# Patient Record
Sex: FEMALE | Race: WHITE | NOT HISPANIC OR LATINO | Employment: UNEMPLOYED | URBAN - METROPOLITAN AREA
[De-identification: names, ages, dates, MRNs, and addresses within clinical notes are randomized per-mention and may not be internally consistent; named-entity substitution may affect disease eponyms.]

---

## 2018-01-03 ENCOUNTER — LAB VISIT (OUTPATIENT)
Dept: LAB | Facility: HOSPITAL | Age: 23
End: 2018-01-03
Attending: OBSTETRICS & GYNECOLOGY
Payer: MEDICAID

## 2018-01-03 ENCOUNTER — OFFICE VISIT (OUTPATIENT)
Dept: OBSTETRICS AND GYNECOLOGY | Facility: CLINIC | Age: 23
End: 2018-01-03
Payer: MEDICAID

## 2018-01-03 VITALS
BODY MASS INDEX: 27.7 KG/M2 | HEIGHT: 63 IN | WEIGHT: 156.31 LBS | SYSTOLIC BLOOD PRESSURE: 124 MMHG | DIASTOLIC BLOOD PRESSURE: 68 MMHG

## 2018-01-03 DIAGNOSIS — Z32.01 POSITIVE PREGNANCY TEST: ICD-10-CM

## 2018-01-03 DIAGNOSIS — O36.8390 UNABLE TO HEAR FETAL HEART TONES AS REASON FOR ULTRASOUND SCAN: ICD-10-CM

## 2018-01-03 DIAGNOSIS — Z32.00 POSSIBLE PREGNANCY: Primary | ICD-10-CM

## 2018-01-03 LAB
BASOPHILS # BLD AUTO: 0.04 K/UL
BASOPHILS NFR BLD: 0.4 %
DIFFERENTIAL METHOD: ABNORMAL
EOSINOPHIL # BLD AUTO: 0.9 K/UL
EOSINOPHIL NFR BLD: 9 %
ERYTHROCYTE [DISTWIDTH] IN BLOOD BY AUTOMATED COUNT: 13.5 %
HCT VFR BLD AUTO: 35.9 %
HGB BLD-MCNC: 12 G/DL
IMM GRANULOCYTES # BLD AUTO: 0.03 K/UL
IMM GRANULOCYTES NFR BLD AUTO: 0.3 %
LYMPHOCYTES # BLD AUTO: 1.2 K/UL
LYMPHOCYTES NFR BLD: 12.8 %
MCH RBC QN AUTO: 28.7 PG
MCHC RBC AUTO-ENTMCNC: 33.4 G/DL
MCV RBC AUTO: 86 FL
MONOCYTES # BLD AUTO: 0.5 K/UL
MONOCYTES NFR BLD: 5.2 %
NEUTROPHILS # BLD AUTO: 6.9 K/UL
NEUTROPHILS NFR BLD: 72.3 %
NRBC BLD-RTO: 0 /100 WBC
PLATELET # BLD AUTO: 150 K/UL
PMV BLD AUTO: 14.1 FL
RBC # BLD AUTO: 4.18 M/UL
TSH SERPL DL<=0.005 MIU/L-ACNC: 0.93 UIU/ML
WBC # BLD AUTO: 9.6 K/UL

## 2018-01-03 PROCEDURE — 99204 OFFICE O/P NEW MOD 45 MIN: CPT | Mod: 25,S$PBB,TH, | Performed by: OBSTETRICS & GYNECOLOGY

## 2018-01-03 PROCEDURE — 99203 OFFICE O/P NEW LOW 30 MIN: CPT | Mod: PBBFAC,PN | Performed by: OBSTETRICS & GYNECOLOGY

## 2018-01-03 PROCEDURE — 86901 BLOOD TYPING SEROLOGIC RH(D): CPT | Mod: 91

## 2018-01-03 PROCEDURE — 84443 ASSAY THYROID STIM HORMONE: CPT

## 2018-01-03 PROCEDURE — 86901 BLOOD TYPING SEROLOGIC RH(D): CPT

## 2018-01-03 PROCEDURE — 76817 TRANSVAGINAL US OBSTETRIC: CPT | Mod: 26,S$PBB,, | Performed by: OBSTETRICS & GYNECOLOGY

## 2018-01-03 PROCEDURE — 36415 COLL VENOUS BLD VENIPUNCTURE: CPT | Mod: PO

## 2018-01-03 PROCEDURE — 85025 COMPLETE CBC W/AUTO DIFF WBC: CPT

## 2018-01-03 PROCEDURE — 99999 PR PBB SHADOW E&M-NEW PATIENT-LVL III: CPT | Mod: PBBFAC,,, | Performed by: OBSTETRICS & GYNECOLOGY

## 2018-01-03 PROCEDURE — 81220 CFTR GENE COM VARIANTS: CPT

## 2018-01-03 PROCEDURE — 76817 TRANSVAGINAL US OBSTETRIC: CPT | Mod: PBBFAC,PN | Performed by: OBSTETRICS & GYNECOLOGY

## 2018-01-03 PROCEDURE — 86762 RUBELLA ANTIBODY: CPT

## 2018-01-03 PROCEDURE — 86703 HIV-1/HIV-2 1 RESULT ANTBDY: CPT

## 2018-01-03 PROCEDURE — 87340 HEPATITIS B SURFACE AG IA: CPT

## 2018-01-03 PROCEDURE — 86592 SYPHILIS TEST NON-TREP QUAL: CPT

## 2018-01-03 NOTE — PROCEDURES
Procedures   ULTRASOUND:   Bedside Ultrasound Findings      Narrative     Ultrasound transvaginal performed with the 6.5mhz probe in the usual fashion.      The uterus appears normal, Viable youngblood intrauterine pregnancy was seen, yolk sac was seen  Crown-rump length = 25 mm with flicker, consistent with 9w2d and EDC 08/06/2018.   The right ovary is not visualized and the left ovary is not visualized. No free fluid in cul-de-sac or adnexal pathology seen      Impression       1.  viable pregnancy, 9w2d by CRL today  2. No abnormality noted.

## 2018-01-03 NOTE — PROGRESS NOTES
"Chief Complaint   Patient presents with    Possible Pregnancy    back pain in the lower middle, hx of mastitis       History of Present Illness   22 y.o.  female patient presents today for new pregnancy, no complaints. Low back pains since epidural 18 months ago- c/section with G1, 4cm max dilation. No bleeding or pain, counseled.     Past medical and surgical history reviewed.   I have reviewed the patient's medical history in detail and updated the computerized patient record.    Review of patient's allergies indicates:   Allergen Reactions    Codeine Hives         Review of Systems - Negative except HPI  GEN ROS: negative for - chills or fever  Breast ROS: negative for breast lumps  Genito-Urinary ROS: no dysuria, trouble voiding, or hematuria      Physical Examination:  /68   Ht 5' 3" (1.6 m)   Wt 70.9 kg (156 lb 4.9 oz)   LMP 10/01/2017 (Approximate)   BMI 27.69 kg/m²    Constitutional: She is oriented to person, place, and time. She appears well-developed and well-nourished. No distress.   HENT:   Head: Normocephalic and atraumatic.   Eyes: Conjunctivae and EOM are normal. No scleral icterus.   Neck: Normal range of motion. Neck supple. No tracheal deviation present.   Cardiovascular: Normal rate.    Pulmonary/Chest: Effort normal. No respiratory distress. She exhibits no tenderness.  Abdominal: Soft. She exhibits no distension and no mass. There is no tenderness. There is no rebound and no guarding.   Genitourinary:    External rectal exam shows no thrombosed external hemorrhoids.    Pelvic exam was performed with patient supine.   No labial fusion.  Musculoskeletal: Normal range of motion. limited back pains  Neurological: She is alert and oriented to person, place, and time. Coordination normal.   Skin: Skin is warm and dry. She is not diaphoretic.   Psychiatric: She has a normal mood and affect.          Assessment:  Early pregnancy, unsure dates.   1. Possible pregnancy  POCT urine " pregnancy   2. Positive pregnancy test  POCT URINE DIPSTICK WITHOUT MICROSCOPE    Type & Screen, Labor & Delivery    CBC auto differential    Hepatitis B surface antigen    HIV-1 and HIV-2 antibodies    Cystic Fibrosis Mutation Panel    TSH    RPR    Rubella antibody, IgG       Plan:  OB labs  Follow up 3 weeks, PAP and exam at follow up  Need op not for possible  plans.   Patient informed will be contacted with results within 2 weeks. Encouraged to please call back or email if she has not heard from us by then.

## 2018-01-04 LAB
ABO + RH BLD: NORMAL
BLD GP AB SCN CELLS X3 SERPL QL: NORMAL
HBV SURFACE AG SERPL QL IA: NEGATIVE
HIV 1+2 AB+HIV1 P24 AG SERPL QL IA: NEGATIVE
RH BLD: NORMAL
RPR SER QL: NORMAL
RUBV IGG SER-ACNC: 18.9 IU/ML
RUBV IGG SER-IMP: REACTIVE

## 2018-01-05 LAB — CFTR MUT ANL BLD/T: NORMAL

## 2018-01-24 ENCOUNTER — ROUTINE PRENATAL (OUTPATIENT)
Dept: OBSTETRICS AND GYNECOLOGY | Facility: CLINIC | Age: 23
End: 2018-01-24
Payer: MEDICAID

## 2018-01-24 VITALS
WEIGHT: 157.44 LBS | BODY MASS INDEX: 27.88 KG/M2 | DIASTOLIC BLOOD PRESSURE: 60 MMHG | SYSTOLIC BLOOD PRESSURE: 110 MMHG

## 2018-01-24 DIAGNOSIS — Z3A.12 12 WEEKS GESTATION OF PREGNANCY: ICD-10-CM

## 2018-01-24 DIAGNOSIS — Z12.4 PAP SMEAR FOR CERVICAL CANCER SCREENING: Primary | ICD-10-CM

## 2018-01-24 PROCEDURE — 87624 HPV HI-RISK TYP POOLED RSLT: CPT

## 2018-01-24 PROCEDURE — 99212 OFFICE O/P EST SF 10 MIN: CPT | Mod: PBBFAC,PN | Performed by: OBSTETRICS & GYNECOLOGY

## 2018-01-24 PROCEDURE — 88175 CYTOPATH C/V AUTO FLUID REDO: CPT | Performed by: PATHOLOGY

## 2018-01-24 PROCEDURE — 87491 CHLMYD TRACH DNA AMP PROBE: CPT

## 2018-01-24 PROCEDURE — 99215 OFFICE O/P EST HI 40 MIN: CPT | Mod: TH,S$PBB,, | Performed by: OBSTETRICS & GYNECOLOGY

## 2018-01-24 PROCEDURE — 88141 CYTOPATH C/V INTERPRET: CPT | Mod: ,,, | Performed by: PATHOLOGY

## 2018-01-24 PROCEDURE — 99999 PR PBB SHADOW E&M-EST. PATIENT-LVL II: CPT | Mod: PBBFAC,,, | Performed by: OBSTETRICS & GYNECOLOGY

## 2018-01-24 PROCEDURE — 87086 URINE CULTURE/COLONY COUNT: CPT

## 2018-01-24 NOTE — PROGRESS NOTES
Complaints today: initial OB exam, no complaints    /60   Wt 71.4 kg (157 lb 6.5 oz)   LMP 10/01/2017 (Approximate)   BMI 27.88 kg/m²     22 y.o., at 12w2d by Estimated Date of Delivery: 18  There is no problem list on file for this patient.    OB History    Para Term  AB Living   3 1 1     1   SAB TAB Ectopic Multiple Live Births           1      # Outcome Date GA Lbr Angel/2nd Weight Sex Delivery Anes PTL Lv   3 Current            2 Term 16 40w0d  2.778 kg (6 lb 2 oz) F CS-LTranv EPI N DANIELA      Birth Comments: Billie Yoder    1                    Dating reviewed    Allergies and problem list reviewed and updated    Medical and surgical history reviewed    Prenatal labs reviewed and updated    Physical Exam:  ABD: soft, gravid, nontender    Assessment:  12 weeks, normal exam, doing well    Plan:    follow up 4 Weeks, bleeding/pain  precautions

## 2018-01-25 LAB
BACTERIA UR CULT: NORMAL
BACTERIA UR CULT: NORMAL
C TRACH DNA SPEC QL NAA+PROBE: NOT DETECTED
N GONORRHOEA DNA SPEC QL NAA+PROBE: NOT DETECTED

## 2018-01-29 LAB
HPV16 AG SPEC QL: POSITIVE
HPV16+18+H RISK 12 DNA CVX-IMP: NEGATIVE
HPV18 DNA SPEC QL NAA+PROBE: NEGATIVE

## 2018-02-07 ENCOUNTER — ROUTINE PRENATAL (OUTPATIENT)
Dept: OBSTETRICS AND GYNECOLOGY | Facility: CLINIC | Age: 23
End: 2018-02-07
Payer: MEDICAID

## 2018-02-07 VITALS — WEIGHT: 158.5 LBS | BODY MASS INDEX: 28.08 KG/M2

## 2018-02-07 DIAGNOSIS — Z3A.14 14 WEEKS GESTATION OF PREGNANCY: Primary | ICD-10-CM

## 2018-02-07 LAB
BILIRUB SERPL-MCNC: NORMAL MG/DL
BLOOD URINE, POC: NORMAL
COLOR, POC UA: NORMAL
GLUCOSE UR QL STRIP: NORMAL
KETONES UR QL STRIP: NORMAL
LEUKOCYTE ESTERASE URINE, POC: NORMAL
NITRITE, POC UA: NORMAL
PH, POC UA: 5
PROTEIN, POC: NORMAL
SPECIFIC GRAVITY, POC UA: NORMAL
UROBILINOGEN, POC UA: NORMAL

## 2018-02-07 PROCEDURE — 99212 OFFICE O/P EST SF 10 MIN: CPT | Mod: PBBFAC,PN | Performed by: OBSTETRICS & GYNECOLOGY

## 2018-02-07 PROCEDURE — 99999 PR PBB SHADOW E&M-EST. PATIENT-LVL II: CPT | Mod: PBBFAC,,, | Performed by: OBSTETRICS & GYNECOLOGY

## 2018-02-07 PROCEDURE — 57452 EXAM OF CERVIX W/SCOPE: CPT | Mod: ,,, | Performed by: OBSTETRICS & GYNECOLOGY

## 2018-02-07 PROCEDURE — 3008F BODY MASS INDEX DOCD: CPT | Mod: ,,, | Performed by: OBSTETRICS & GYNECOLOGY

## 2018-02-07 PROCEDURE — 81002 URINALYSIS NONAUTO W/O SCOPE: CPT | Mod: ,,, | Performed by: OBSTETRICS & GYNECOLOGY

## 2018-02-07 PROCEDURE — 99213 OFFICE O/P EST LOW 20 MIN: CPT | Mod: TH,25,, | Performed by: OBSTETRICS & GYNECOLOGY

## 2018-02-07 NOTE — PROGRESS NOTES
Complaints today: ASCUS PAP, need colposcopy    Wt 71.9 kg (158 lb 8.2 oz)   LMP 10/01/2017 (Approximate)   BMI 28.08 kg/m²     22 y.o., at 14w2d by Estimated Date of Delivery: 18  There is no problem list on file for this patient.    OB History    Para Term  AB Living   3 1 1     1   SAB TAB Ectopic Multiple Live Births           1      # Outcome Date GA Lbr Angel/2nd Weight Sex Delivery Anes PTL Lv   3 Current            2 Term 16 40w0d  2.778 kg (6 lb 2 oz) F CS-LTranv EPI N DANIELA      Birth Comments: Billie Ryan Yoder    1                    Dating reviewed    Allergies and problem list reviewed and updated    Medical and surgical history reviewed    Prenatal labs reviewed and updated    Physical Exam:  ABD: soft, gravid, nontender      COLPOSCOPY:  2018    PAP Result:  ASUCS w HR-HPV  1. 14 weeks gestation of pregnancy  POCT URINE DIPSTICK WITHOUT MICROSCOPE    US OB More Than 14 Wks First Gestation       PRE-COLPOSCOPY PROCEDURE COUNSELING:  Discussed the abnormal pap test findings, HPV, need for colposcopy and possible biopsies to determine a diagnosis and plan of care, treatments available, the minimal risks of bleeding and infection with a colposcopy, alternatives to colposcopy and she agrees to proceed.    Procedure:   Speculum was placed. Cervix completely visualized. transformation zone clearly visualized. Green filter activated: vascular abnormalities: not seen  Green filter disengaged and acetic acid applied in the usual fashion:  AcetoWhite epithelium not seen.  Mosaic pattern not seen.  Biopsy not performed.  ECC not done.    Monsel's solution applied to biopsy site for hemostasis and tolerated well.   The speculum was removed.  The patient tolerated the procedure well.    POST COLPOSCOPY COUNSELING:   Manage post colposcopy cramping with NSAIDs, Tylenol or Rx per [employer].  Avoid anything in vagina (intercourse, douching, tampons) one week after the procedure.  Expect  a clumpy blackish vaginal discharge (Monsel's solution) for several days.  Report bleeding heavier than a period, worsening pain, fever > 101.0 F, or foul-smelling vaginal discharge.  HPV vaccine recommended according to FDA age guidelines.  Importance of follow-up stressed.    Counseling lasted approximately 15 minutes and all her questions were answered.    Assessment:  1. 14 weeks gestation of pregnancy  POCT URINE DIPSTICK WITHOUT MICROSCOPE    US OB More Than 14 Wks First Gestation     Plan:PAP PP    Patient informed will be contacted within 2 weeks of results, either normal or abnormal. Please call if she has not heard from us by then.     Assessment:  Normal colpo, 14 weeks, doing well    Plan:    follow up 4 Weeks, bleeding/pain precautions   PAP PP  U/s at follow up

## 2018-02-21 ENCOUNTER — ROUTINE PRENATAL (OUTPATIENT)
Dept: OBSTETRICS AND GYNECOLOGY | Facility: CLINIC | Age: 23
End: 2018-02-21
Payer: MEDICAID

## 2018-02-21 VITALS — WEIGHT: 160.5 LBS | DIASTOLIC BLOOD PRESSURE: 60 MMHG | BODY MASS INDEX: 28.43 KG/M2 | SYSTOLIC BLOOD PRESSURE: 120 MMHG

## 2018-02-21 DIAGNOSIS — Z3A.16 16 WEEKS GESTATION OF PREGNANCY: Primary | ICD-10-CM

## 2018-02-21 LAB
BILIRUB SERPL-MCNC: NORMAL MG/DL
BLOOD URINE, POC: NORMAL
COLOR, POC UA: NORMAL
GLUCOSE UR QL STRIP: NORMAL
KETONES UR QL STRIP: NORMAL
LEUKOCYTE ESTERASE URINE, POC: NORMAL
NITRITE, POC UA: NORMAL
PH, POC UA: 7
PROTEIN, POC: NORMAL
SPECIFIC GRAVITY, POC UA: NORMAL
UROBILINOGEN, POC UA: NORMAL

## 2018-02-21 PROCEDURE — 3008F BODY MASS INDEX DOCD: CPT | Mod: ,,, | Performed by: OBSTETRICS & GYNECOLOGY

## 2018-02-21 PROCEDURE — 99212 OFFICE O/P EST SF 10 MIN: CPT | Mod: PBBFAC,PN | Performed by: OBSTETRICS & GYNECOLOGY

## 2018-02-21 PROCEDURE — 99213 OFFICE O/P EST LOW 20 MIN: CPT | Mod: TH,S$PBB,, | Performed by: OBSTETRICS & GYNECOLOGY

## 2018-02-21 PROCEDURE — 81002 URINALYSIS NONAUTO W/O SCOPE: CPT | Mod: PBBFAC,PN | Performed by: OBSTETRICS & GYNECOLOGY

## 2018-02-21 PROCEDURE — 99999 PR PBB SHADOW E&M-EST. PATIENT-LVL II: CPT | Mod: PBBFAC,,, | Performed by: OBSTETRICS & GYNECOLOGY

## 2018-02-21 NOTE — PROGRESS NOTES
Complaints today: none, early for u/s    /60   Wt 72.8 kg (160 lb 7.9 oz)   LMP 10/01/2017 (Approximate)   BMI 28.43 kg/m²     22 y.o., at 16w2d by Estimated Date of Delivery: 18  There is no problem list on file for this patient.    OB History    Para Term  AB Living   3 1 1     1   SAB TAB Ectopic Multiple Live Births           1      # Outcome Date GA Lbr Angel/2nd Weight Sex Delivery Anes PTL Lv   3 Current            2 Term 16 40w0d  2.778 kg (6 lb 2 oz) F CS-LTranv EPI N DANIELA      Birth Comments: Billie Yoder    1                    Dating reviewed    Allergies and problem list reviewed and updated    Medical and surgical history reviewed    Prenatal labs reviewed and updated    Physical Exam:  ABD: soft, gravid, nontender    Assessment:  16 weeks, doing well    Plan:    follow up 2 Weeks, bleeding/pain precautions   U/s at follow up

## 2018-05-23 PROBLEM — R53.1 WEAKNESS: Status: ACTIVE | Noted: 2018-05-23

## 2018-05-23 PROBLEM — O36.8190 DECREASED FETAL MOVEMENT AFFECTING MANAGEMENT OF MOTHER, ANTEPARTUM: Status: ACTIVE | Noted: 2018-05-23
